# Patient Record
Sex: FEMALE | Race: WHITE
[De-identification: names, ages, dates, MRNs, and addresses within clinical notes are randomized per-mention and may not be internally consistent; named-entity substitution may affect disease eponyms.]

---

## 2017-02-02 ENCOUNTER — HOSPITAL ENCOUNTER (OUTPATIENT)
Dept: HOSPITAL 47 - LABWHC1 | Age: 32
Discharge: HOME | End: 2017-02-02
Payer: COMMERCIAL

## 2017-02-02 DIAGNOSIS — Z01.812: Primary | ICD-10-CM

## 2017-02-02 LAB
BASOPHILS # BLD AUTO: 0.1 K/UL (ref 0–0.2)
BASOPHILS NFR BLD AUTO: 1 %
CH: 26.2
CHCM: 30.8
EOSINOPHIL # BLD AUTO: 0.2 K/UL (ref 0–0.7)
EOSINOPHIL NFR BLD AUTO: 2 %
ERYTHROCYTE [DISTWIDTH] IN BLOOD BY AUTOMATED COUNT: 4.65 M/UL (ref 3.8–5.4)
ERYTHROCYTE [DISTWIDTH] IN BLOOD: 14.3 % (ref 11.5–15.5)
HCT VFR BLD AUTO: 39.6 % (ref 34–46)
HDW: 2.42
HGB BLD-MCNC: 12.3 GM/DL (ref 11.4–16)
LUC NFR BLD AUTO: 2 %
LYMPHOCYTES # SPEC AUTO: 2.1 K/UL (ref 1–4.8)
LYMPHOCYTES NFR SPEC AUTO: 27 %
MCH RBC QN AUTO: 26.4 PG (ref 25–35)
MCHC RBC AUTO-ENTMCNC: 31 G/DL (ref 31–37)
MCV RBC AUTO: 85.2 FL (ref 80–100)
MONOCYTES # BLD AUTO: 0.4 K/UL (ref 0–1)
MONOCYTES NFR BLD AUTO: 5 %
NEUTROPHILS # BLD AUTO: 4.8 K/UL (ref 1.3–7.7)
NEUTROPHILS NFR BLD AUTO: 63 %
WBC # BLD AUTO: 0.16 10*3/UL
WBC # BLD AUTO: 7.6 K/UL (ref 3.8–10.6)
WBC (PEROX): 8.3

## 2017-02-02 PROCEDURE — 36415 COLL VENOUS BLD VENIPUNCTURE: CPT

## 2017-02-02 PROCEDURE — 85025 COMPLETE CBC W/AUTO DIFF WBC: CPT

## 2017-02-03 ENCOUNTER — HOSPITAL ENCOUNTER (OUTPATIENT)
Dept: HOSPITAL 47 - OR | Age: 32
Discharge: HOME | End: 2017-02-03
Payer: COMMERCIAL

## 2017-02-03 VITALS — SYSTOLIC BLOOD PRESSURE: 122 MMHG | DIASTOLIC BLOOD PRESSURE: 65 MMHG

## 2017-02-03 VITALS — TEMPERATURE: 96.8 F

## 2017-02-03 VITALS — BODY MASS INDEX: 28.5 KG/M2

## 2017-02-03 VITALS — HEART RATE: 78 BPM | RESPIRATION RATE: 18 BRPM

## 2017-02-03 DIAGNOSIS — N92.0: ICD-10-CM

## 2017-02-03 DIAGNOSIS — Z91.040: ICD-10-CM

## 2017-02-03 DIAGNOSIS — K21.9: ICD-10-CM

## 2017-02-03 DIAGNOSIS — F17.200: ICD-10-CM

## 2017-02-03 DIAGNOSIS — N85.00: Primary | ICD-10-CM

## 2017-02-03 PROCEDURE — 88305 TISSUE EXAM BY PATHOLOGIST: CPT

## 2017-02-03 PROCEDURE — 58563 HYSTEROSCOPY ABLATION: CPT

## 2017-02-03 PROCEDURE — 81025 URINE PREGNANCY TEST: CPT

## 2017-02-03 RX ADMIN — HYDROMORPHONE HYDROCHLORIDE PRN MG: 1 INJECTION, SOLUTION INTRAMUSCULAR; INTRAVENOUS; SUBCUTANEOUS at 08:53

## 2017-02-03 RX ADMIN — HYDROMORPHONE HYDROCHLORIDE PRN MG: 1 INJECTION, SOLUTION INTRAMUSCULAR; INTRAVENOUS; SUBCUTANEOUS at 08:48

## 2017-02-03 NOTE — P.OP
Date of Procedure: 02/03/17


Preoperative Diagnosis: 


Menorrhagia


Postoperative Diagnosis: 


Same


Procedure(s) Performed: 


D&C with hysteroscopy and NovaSure


Anesthesia: SILVINA


Surgeon: Jt Recio


Estimated Blood Loss (ml): 3


Pathology: other (Uterine curettings)


Condition: stable


Disposition: same day


Operative Findings: 


No gross pathology


Description of Procedure: 


Patient was taken to the operating suite where a general anesthetic was found 

be adequate.  She was prepped and draped in the normal sterile fashion and 

placed in dorsal lithotomy position.  Initially a speculum was inserted in 

vagina into lip cervix was identified and grasped with a single-tooth 

tenaculum.  Cervix was then dilated and uterus was sounded to 9 cm.  Camera was 

then inserted no significant pathology was noted therefore camera was removed 

and sharp curettings of the endometrium were obtained.  This tissue was 

collected and placed on Telfa.  It was then sent to pathology.  Once this was 

accomplished NovaSure system was brought in with a length of 5. tube was 

tested.  Once it passed its patency test it was enabled and didn't burn for 2 

minutes.  At conclusion the burn system was removed and camera was reinserted 

with excellent burn noted.  All instruments were then removed sponge, lap, 

needle counts were all correct 2.  She was taken to the recovery room in 

stable and satisfactory condition.





Plan - Discharge Summary


New Discharge Prescriptions: 


Ibuprofen [Motrin] 600 mg PO Q6HR PRN #30 tab


 PRN Reason: Pain


Discharge Medication List





Ibuprofen [Motrin] 600 mg PO Q6HR PRN #30 tab 02/03/17 [Rx]








Follow up Appointment(s)/Referral(s): 


Jt Recio DO [Doctor of Osteopathic Medicine] - 2 Weeks


Activity/Diet/Wound Care/Special Instructions: 


Pelvic rest, no heavy lifting or driving today.  Should she have any high 

temperatures, heavy bleeding or severe pain call my office


Discharge Disposition: HOME SELF-CARE

## 2017-02-03 NOTE — P.HPOB
History of Present Illness


H&P Date: 17


Chief Complaint: menorrhagia





Kaylie is a 31 year old female with a history of heavy bleeding. The bleeding 

is described as very heavy and very limiting to her lifestyle as she is unable 

to function well while she is on her menses. she is sched for a d&c 

hysteroscopy with novasure to try to resolve the bleeding. R/B/A reviewed in 

detail including bleeding, infection, thermal injuries and potential pain both 

post operatively and in the future.  all questions are answered for her prior 

to proceeding to the operating room.  





Past Medical History


Past Medical History: Seizure Disorder


Additional Past Medical History / Comment(s): seizures 3 yrs ago,


History of Any Multi-Drug Resistant Organisms: None Reported


Past Surgical History:  Section


Past Anesthesia/Blood Transfusion Reactions: No Reported Reaction


Past Psychological History: No Psychological Hx Reported


Smoking Status: Current every day smoker


Past Alcohol Use History: Daily


Additional Past Alcohol Use History / Comment(s): smokes 1 PPD for 15 yrs


Past Drug Use History: None Reported





- Past Family History


  ** Mother


Family Medical History: No Reported History





Medications and Allergies


 Home Medications











 Medication  Instructions  Recorded  Confirmed  Type


 


No Known Home Medications [No  17 History





Known Home Medications]    











 Allergies











Allergy/AdvReac Type Severity Reaction Status Date / Time


 


latex Allergy  Itching Verified 17 06:41














Exam


Osteopathic Statement: *.  No significant issues noted on an osteopathic 

structural exam other than those noted in the History and Physical/Consult.





- Vital Signs


Vital signs: 


 Vital Signs











  Temp Pulse Resp BP Pulse Ox


 


 17 06:48  97.7 F  81  16  113/54  99














- OBG Physical Exam


Breast: both: normal (no masses)


Abdomen: bowel sounds normal, no diffuse tenderness, no bruit present, no 

guarding noted, no hepatomegaly, no splenomegaly, no mass


Vulva: both: normal


Vagina: normal moisture, no discharge


Cervix: no lesion, no discharge


Uterus: normal size, normal contour


Adnexa: both: normal


Anus/Rectum: normal perianal skin, no rectal mass, no hemorrhoids, heme negative

## 2017-05-02 ENCOUNTER — HOSPITAL ENCOUNTER (OUTPATIENT)
Dept: HOSPITAL 47 - RADXRMAIN | Age: 32
Discharge: HOME | End: 2017-05-02
Payer: COMMERCIAL

## 2017-05-02 DIAGNOSIS — M41.9: Primary | ICD-10-CM

## 2017-05-02 DIAGNOSIS — Z98.1: ICD-10-CM

## 2017-05-02 PROCEDURE — 72070 X-RAY EXAM THORAC SPINE 2VWS: CPT

## 2017-05-02 PROCEDURE — 72110 X-RAY EXAM L-2 SPINE 4/>VWS: CPT

## 2017-05-02 PROCEDURE — 72050 X-RAY EXAM NECK SPINE 4/5VWS: CPT

## 2017-05-02 NOTE — XR
Thoracic spine

 

HISTORY: Back pain, scoliosis

 

2 views of the thoracic spine

 

Correlation to cervical spine same date

 

There is a mild dextroscoliosis centered at approximately T6. Thoracic vertebral bodies show preserve
d height, alignment, and bone mineralization. Disc spaces are maintained.

 

IMPRESSION: Mild thoracic scoliosis

## 2017-05-02 NOTE — XR
Cervical spine

 

HISTORY: Pain, R 52, scoliosis

 

5 views of the cervical spine

 

Correlation to thoracic spine same date

 

Cervical vertebral bodies show preserved height and bone mineralization. No evident foraminal encroac
hment. Disc spaces are maintained. Loss of cervical lordosis may be due to muscle spasm. Prevertebral
 soft tissues are normal. Anterolisthesis grade 1 C2-3, C3-4, C4-5 is likely physiologic.

 

IMPRESSION: Scoliosis, loss of cervical lordosis

## 2017-05-02 NOTE — XR
Lumbosacral spine

 

HISTORY: Scoliosis, back pain

 

5 views of the lumbosacral spine

 

Correlation to thoracic spine same date

 

Posterior elements and L1, L2, possibly T12, T11 show incomplete fusion. Fallopian tubal ligation cli
ps are present within the pelvis. Lumbar vertebral bodies show preserved height and alignment. Disc s
paces are maintained.

 

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 MRI may be of bene
fit of the lower thoracic and lumbar spine.

## 2017-05-18 ENCOUNTER — HOSPITAL ENCOUNTER (OUTPATIENT)
Dept: HOSPITAL 47 - LABWHC1 | Age: 32
Discharge: HOME | End: 2017-05-18
Payer: COMMERCIAL

## 2017-05-18 DIAGNOSIS — N76.2: Primary | ICD-10-CM

## 2017-05-18 PROCEDURE — 86696 HERPES SIMPLEX TYPE 2 TEST: CPT

## 2017-05-18 PROCEDURE — 86694 HERPES SIMPLEX NES ANTBDY: CPT

## 2017-05-18 PROCEDURE — 86695 HERPES SIMPLEX TYPE 1 TEST: CPT

## 2017-05-18 PROCEDURE — 36415 COLL VENOUS BLD VENIPUNCTURE: CPT

## 2021-08-17 ENCOUNTER — HOSPITAL ENCOUNTER (EMERGENCY)
Dept: HOSPITAL 47 - EC | Age: 36
LOS: 1 days | Discharge: HOME | End: 2021-08-18
Payer: COMMERCIAL

## 2021-08-17 DIAGNOSIS — R55: Primary | ICD-10-CM

## 2021-08-17 DIAGNOSIS — R00.2: ICD-10-CM

## 2021-08-17 DIAGNOSIS — R42: ICD-10-CM

## 2021-08-17 DIAGNOSIS — Z91.040: ICD-10-CM

## 2021-08-17 PROCEDURE — 81025 URINE PREGNANCY TEST: CPT

## 2021-08-17 PROCEDURE — 93005 ELECTROCARDIOGRAM TRACING: CPT

## 2021-08-17 PROCEDURE — 81001 URINALYSIS AUTO W/SCOPE: CPT

## 2021-08-17 PROCEDURE — 99285 EMERGENCY DEPT VISIT HI MDM: CPT

## 2021-08-17 PROCEDURE — 36415 COLL VENOUS BLD VENIPUNCTURE: CPT

## 2021-08-17 PROCEDURE — 84484 ASSAY OF TROPONIN QUANT: CPT

## 2021-08-17 PROCEDURE — 80053 COMPREHEN METABOLIC PANEL: CPT

## 2021-08-17 PROCEDURE — 85025 COMPLETE CBC W/AUTO DIFF WBC: CPT

## 2021-08-18 VITALS — TEMPERATURE: 98.2 F

## 2021-08-18 VITALS — HEART RATE: 85 BPM | DIASTOLIC BLOOD PRESSURE: 53 MMHG | RESPIRATION RATE: 18 BRPM | SYSTOLIC BLOOD PRESSURE: 105 MMHG

## 2021-08-18 LAB
ALBUMIN SERPL-MCNC: 4.2 G/DL (ref 3.5–5)
ALP SERPL-CCNC: 62 U/L (ref 38–126)
ALT SERPL-CCNC: 17 U/L (ref 4–34)
ANION GAP SERPL CALC-SCNC: 10 MMOL/L
AST SERPL-CCNC: 20 U/L (ref 14–36)
BASOPHILS # BLD AUTO: 0.1 K/UL (ref 0–0.2)
BASOPHILS NFR BLD AUTO: 1 %
BUN SERPL-SCNC: 12 MG/DL (ref 7–17)
CALCIUM SPEC-MCNC: 9.8 MG/DL (ref 8.4–10.2)
CHLORIDE SERPL-SCNC: 104 MMOL/L (ref 98–107)
CO2 SERPL-SCNC: 21 MMOL/L (ref 22–30)
EOSINOPHIL # BLD AUTO: 0.3 K/UL (ref 0–0.7)
EOSINOPHIL NFR BLD AUTO: 1 %
ERYTHROCYTE [DISTWIDTH] IN BLOOD BY AUTOMATED COUNT: 4.62 M/UL (ref 3.8–5.4)
ERYTHROCYTE [DISTWIDTH] IN BLOOD: 13.4 % (ref 11.5–15.5)
GLUCOSE SERPL-MCNC: 105 MG/DL (ref 74–99)
HCT VFR BLD AUTO: 45.4 % (ref 34–46)
HGB BLD-MCNC: 15 GM/DL (ref 11.4–16)
HYALINE CASTS UR QL AUTO: 1 /LPF (ref 0–2)
LYMPHOCYTES # SPEC AUTO: 2.6 K/UL (ref 1–4.8)
LYMPHOCYTES NFR SPEC AUTO: 14 %
MCH RBC QN AUTO: 32.4 PG (ref 25–35)
MCHC RBC AUTO-ENTMCNC: 33 G/DL (ref 31–37)
MCV RBC AUTO: 98.2 FL (ref 80–100)
MONOCYTES # BLD AUTO: 0.8 K/UL (ref 0–1)
MONOCYTES NFR BLD AUTO: 4 %
NEUTROPHILS # BLD AUTO: 14.6 K/UL (ref 1.3–7.7)
NEUTROPHILS NFR BLD AUTO: 79 %
PH UR: 6 [PH] (ref 5–8)
PLATELET # BLD AUTO: 196 K/UL (ref 150–450)
POTASSIUM SERPL-SCNC: 3.9 MMOL/L (ref 3.5–5.1)
PROT SERPL-MCNC: 6.6 G/DL (ref 6.3–8.2)
RBC UR QL: <1 /HPF (ref 0–5)
SODIUM SERPL-SCNC: 135 MMOL/L (ref 137–145)
SP GR UR: 1.01 (ref 1–1.03)
SQUAMOUS UR QL AUTO: 3 /HPF (ref 0–4)
UROBILINOGEN UR QL STRIP: <2 MG/DL (ref ?–2)
WBC # BLD AUTO: 18.5 K/UL (ref 3.8–10.6)
WBC #/AREA URNS HPF: 2 /HPF (ref 0–5)

## 2021-08-18 NOTE — ED
Syncope HPI





- General


Chief Complaint: Syncope


Stated Complaint: Syncope


Time Seen by Provider: 21 23:18


Source: patient


Mode of arrival: EMS





- History of Present Illness


Initial Comments: 





's patient is a 35-year-old woman who presents to be evaluated for a 

constellation of symptoms that occurred about an hour ago.  The patient states 

that she had been sitting talking with friends.  She started to feel lightheaded

and warm.  She felt like she might pass out so she went outside to get some 

fresh air.  She states that the symptoms lasted maybe a couple of minutes and th

en she was feeling a little better.  She states she feels almost normal now.  

She was not having chest pains.  The patient did have 2 alcoholic beverages 

earlier, she had a vodka drink and she had a glass of wine.  She also had a 

little bit of marijuana.


MD Complaint: almost passed out


-: minutes(s)


Prodromal Symptoms: lightheaded, palpitations, other


-: minutes(s)


Witnessed: yes - by bystander


Injuries Sustained Associated with Event: None


Current Symptoms: back to baseline


History: previous syncopal episode


Context: at rest, alcohol use


Treatments Prior to Arrival: none





- Related Data


                                  Previous Rx's











 Medication  Instructions  Recorded


 


Ibuprofen [Motrin] 600 mg PO Q6HR PRN #30 tab 17











                                    Allergies











Allergy/AdvReac Type Severity Reaction Status Date / Time


 


latex Allergy  Itching Verified 17 06:41














Review of Systems


ROS Statement: 


Those systems with pertinent positive or pertinent negative responses have been 

documented in the HPI.





ROS Other: All systems not noted in ROS Statement are negative.


Constitutional: Denies: fever, chills, weakness


Eyes: Reports: other (She briefly saw spots).  Denies: eye pain


Respiratory: Denies: cough, dyspnea, wheezes


Cardiovascular: Reports: palpitations, syncope (Near-syncope).  Denies: chest 

pain, edema


Gastrointestinal: Denies: abdominal pain, nausea, vomiting, diarrhea, melena, 

hematochezia


Genitourinary: Denies: dysuria, hematuria


Musculoskeletal: Denies: back pain


Skin: Denies: rash


Neurological: Denies: headache, weakness, numbness





Past Medical History


Past Medical History: Seizure Disorder


Additional Past Medical History / Comment(s): seizures 3 yrs ago,


History of Any Multi-Drug Resistant Organisms: None Reported


Past Surgical History:  Section


Past Anesthesia/Blood Transfusion Reactions: No Reported Reaction


Past Psychological History: No Psychological Hx Reported


Smoking Status: Unknown if ever smoked


Past Alcohol Use History: Daily


Past Drug Use History: None Reported





- Past Family History


  ** Mother


Family Medical History: No Reported History





General Exam


General appearance: alert, in no apparent distress


Head exam: Present: atraumatic, normocephalic


Eye exam: Present: normal appearance.  Absent: scleral icterus, conjunctival 

injection


ENT exam: Present: normal oropharynx


Neck exam: Present: normal inspection


Respiratory exam: Present: normal lung sounds bilaterally.  Absent: respiratory 

distress, wheezes, rales, rhonchi, stridor


Cardiovascular Exam: Present: regular rate, normal rhythm, normal heart sounds. 

 Absent: systolic murmur, diastolic murmur, rubs, gallop


GI/Abdominal exam: Present: soft.  Absent: distended, tenderness, guarding, 

rebound, rigid, mass


Extremities exam: Present: normal inspection, normal capillary refill.  Absent: 

pedal edema, calf tenderness


Back exam: Present: normal inspection.  Absent: CVA tenderness (R), CVA 

tenderness (L)


Neurological exam: Present: alert, oriented X3, CN II-XII intact.  Absent: motor

 sensory deficit


Skin exam: Present: warm, dry, intact, normal color.  Absent: rash





Course


                                   Vital Signs











  21





  00:20 00:37


 


Temperature 98.2 F 


 


Pulse Rate 75 85


 


Respiratory 16 18





Rate  


 


Blood Pressure 113/64 105/53


 


O2 Sat by Pulse 97 98





Oximetry  














EKG Findings





- EKG Results:


EKG: interpreted by ERMD, sinus rhythm (Rate 78 bpm), normal axis, normal QRS





- Blocks, Axis, Hypertrophy, ST Abn:


Repolarization changes or abnormalities: nonspecific abnormality, ST segment, 

and/or T wave





Medical Decision Making





- Lab Data


Result diagrams: 


                                 21 00:10





                                 21 00:10


                                   Lab Results











  21 Range/Units





  00:10 00:10 00:10 


 


WBC  18.5 H    (3.8-10.6)  k/uL


 


RBC  4.62    (3.80-5.40)  m/uL


 


Hgb  15.0    (11.4-16.0)  gm/dL


 


Hct  45.4    (34.0-46.0)  %


 


MCV  98.2    (80.0-100.0)  fL


 


MCH  32.4    (25.0-35.0)  pg


 


MCHC  33.0    (31.0-37.0)  g/dL


 


RDW  13.4    (11.5-15.5)  %


 


Plt Count  196    (150-450)  k/uL


 


MPV  9.1    


 


Neutrophils %  79    %


 


Lymphocytes %  14    %


 


Monocytes %  4    %


 


Eosinophils %  1    %


 


Basophils %  1    %


 


Neutrophils #  14.6 H    (1.3-7.7)  k/uL


 


Lymphocytes #  2.6    (1.0-4.8)  k/uL


 


Monocytes #  0.8    (0-1.0)  k/uL


 


Eosinophils #  0.3    (0-0.7)  k/uL


 


Basophils #  0.1    (0-0.2)  k/uL


 


Sodium    135 L  (137-145)  mmol/L


 


Potassium    3.9  (3.5-5.1)  mmol/L


 


Chloride    104  ()  mmol/L


 


Carbon Dioxide    21 L  (22-30)  mmol/L


 


Anion Gap    10  mmol/L


 


BUN    12  (7-17)  mg/dL


 


Creatinine    0.68  (0.52-1.04)  mg/dL


 


Est GFR (CKD-EPI)AfAm    >90  (>60 ml/min/1.73 sqM)  


 


Est GFR (CKD-EPI)NonAf    >90  (>60 ml/min/1.73 sqM)  


 


Glucose    105 H  (74-99)  mg/dL


 


Calcium    9.8  (8.4-10.2)  mg/dL


 


Total Bilirubin    0.1 L  (0.2-1.3)  mg/dL


 


AST    20  (14-36)  U/L


 


ALT    17  (4-34)  U/L


 


Alkaline Phosphatase    62  ()  U/L


 


Troponin I     (0.000-0.034)  ng/mL


 


Total Protein    6.6  (6.3-8.2)  g/dL


 


Albumin    4.2  (3.5-5.0)  g/dL


 


Urine Color   Yellow   


 


Urine Appearance   Clear   (Clear)  


 


Urine pH   6.0   (5.0-8.0)  


 


Ur Specific Gravity   1.015   (1.001-1.035)  


 


Urine Protein   Negative   (Negative)  


 


Urine Glucose (UA)   Negative   (Negative)  


 


Urine Ketones   Negative   (Negative)  


 


Urine Blood   Negative   (Negative)  


 


Urine Nitrite   Negative   (Negative)  


 


Urine Bilirubin   Negative   (Negative)  


 


Urine Urobilinogen   <2.0   (<2.0)  mg/dL


 


Ur Leukocyte Esterase   Trace H   (Negative)  


 


Urine RBC   <1   (0-5)  /hpf


 


Urine WBC   2   (0-5)  /hpf


 


Ur Squamous Epith Cells   3   (0-4)  /hpf


 


Hyaline Casts   1   (0-2)  /lpf


 


Urine Mucus   Rare H   (None)  /hpf


 


Urine HCG, Qual     (Not Detectd)  














  21 Range/Units





  00:10 00:10 


 


WBC    (3.8-10.6)  k/uL


 


RBC    (3.80-5.40)  m/uL


 


Hgb    (11.4-16.0)  gm/dL


 


Hct    (34.0-46.0)  %


 


MCV    (80.0-100.0)  fL


 


MCH    (25.0-35.0)  pg


 


MCHC    (31.0-37.0)  g/dL


 


RDW    (11.5-15.5)  %


 


Plt Count    (150-450)  k/uL


 


MPV    


 


Neutrophils %    %


 


Lymphocytes %    %


 


Monocytes %    %


 


Eosinophils %    %


 


Basophils %    %


 


Neutrophils #    (1.3-7.7)  k/uL


 


Lymphocytes #    (1.0-4.8)  k/uL


 


Monocytes #    (0-1.0)  k/uL


 


Eosinophils #    (0-0.7)  k/uL


 


Basophils #    (0-0.2)  k/uL


 


Sodium    (137-145)  mmol/L


 


Potassium    (3.5-5.1)  mmol/L


 


Chloride    ()  mmol/L


 


Carbon Dioxide    (22-30)  mmol/L


 


Anion Gap    mmol/L


 


BUN    (7-17)  mg/dL


 


Creatinine    (0.52-1.04)  mg/dL


 


Est GFR (CKD-EPI)AfAm    (>60 ml/min/1.73 sqM)  


 


Est GFR (CKD-EPI)NonAf    (>60 ml/min/1.73 sqM)  


 


Glucose    (74-99)  mg/dL


 


Calcium    (8.4-10.2)  mg/dL


 


Total Bilirubin    (0.2-1.3)  mg/dL


 


AST    (14-36)  U/L


 


ALT    (4-34)  U/L


 


Alkaline Phosphatase    ()  U/L


 


Troponin I  <0.012   (0.000-0.034)  ng/mL


 


Total Protein    (6.3-8.2)  g/dL


 


Albumin    (3.5-5.0)  g/dL


 


Urine Color    


 


Urine Appearance    (Clear)  


 


Urine pH    (5.0-8.0)  


 


Ur Specific Gravity    (1.001-1.035)  


 


Urine Protein    (Negative)  


 


Urine Glucose (UA)    (Negative)  


 


Urine Ketones    (Negative)  


 


Urine Blood    (Negative)  


 


Urine Nitrite    (Negative)  


 


Urine Bilirubin    (Negative)  


 


Urine Urobilinogen    (<2.0)  mg/dL


 


Ur Leukocyte Esterase    (Negative)  


 


Urine RBC    (0-5)  /hpf


 


Urine WBC    (0-5)  /hpf


 


Ur Squamous Epith Cells    (0-4)  /hpf


 


Hyaline Casts    (0-2)  /lpf


 


Urine Mucus    (None)  /hpf


 


Urine HCG, Qual   Not Detected  (Not Detectd)  














Disposition


Clinical Impression: 


 Near syncope





Disposition: HOME SELF-CARE


Condition: Good


Instructions (If sedation given, give patient instructions):  Near Syncope (ED)


Is patient prescribed a controlled substance at d/c from ED?: No


Referrals: 


Radha Clarke MD [Primary Care Provider] - 1-2 days

## 2022-08-17 ENCOUNTER — HOSPITAL ENCOUNTER (OUTPATIENT)
Dept: HOSPITAL 47 - LABWHC1 | Age: 37
Discharge: HOME | End: 2022-08-17
Attending: PSYCHIATRY & NEUROLOGY
Payer: COMMERCIAL

## 2022-08-17 DIAGNOSIS — M25.50: Primary | ICD-10-CM

## 2022-08-17 LAB — RHEUMATOID FACT SERPL-ACNC: <10 IU/ML (ref 0–15)

## 2022-08-17 PROCEDURE — 83516 IMMUNOASSAY NONANTIBODY: CPT

## 2022-08-17 PROCEDURE — 86235 NUCLEAR ANTIGEN ANTIBODY: CPT

## 2022-08-17 PROCEDURE — 86200 CCP ANTIBODY: CPT

## 2022-08-17 PROCEDURE — 86431 RHEUMATOID FACTOR QUANT: CPT

## 2022-08-17 PROCEDURE — 36415 COLL VENOUS BLD VENIPUNCTURE: CPT

## 2022-08-17 PROCEDURE — 86140 C-REACTIVE PROTEIN: CPT

## 2022-08-17 PROCEDURE — 86038 ANTINUCLEAR ANTIBODIES: CPT

## 2022-08-18 LAB
CCP IGG SERPL-ACNC: <0.5 U/ML
CHROMATIN ANTIBODY: <0.2 AI
ENA JO1 AB SER IA-ACNC: <0.2 AI
ENA SCL70 AB SER IA-ACNC: <0.2 AI
ENA SM AB SER IA-ACNC: NEGATIVE
PATH INTERP SPEC-IMP: NEGATIVE